# Patient Record
(demographics unavailable — no encounter records)

---

## 2024-10-28 NOTE — HISTORY OF PRESENT ILLNESS
[de-identified] : 38-year-old F with a longstanding history of obesity presents for a weight-loss medication follow-up.  2 lb weight loss since last visit two months ago; on Zepbound 15 mg/week. Reports no abdominal pain, nausea/vomiting, constipation, diarrhea, reflux/heartburn. Patient eating 3 meals/day, drinking adequate zero-calorie fluids/day, and exercising by walking and following strength training videos.  Weight at Initial Consult: 236 lbs Current Weight: 212 lbs Anti-Obesity Medication(s): Zepbound Start Date: 2/8/24 Current Dose: 15 mg Side-Effects from Anti-Obesity Medication(s): none Obesity Comorbidities: HLD, borderline HTN, hypothyroidism  Comorbidities Improved/Resolved: blood work to be done prior to next visit History of Bariatric Surgery: No

## 2024-10-28 NOTE — ASSESSMENT
[FreeTextEntry1] : 38-year-old F with a longstanding history of obesity presents for a weight-loss medication follow-up.  2 lb weight loss since last visit two months ago; on Zepbound 15 mg/week. Nutrition and exercise guidelines reviewed with the patient.   Last Labs Done: 5/28/24 (last TSH was 11.60 uIU/mL) Next Labs Due: ~11/28/24  Continue 3 well-balanced meals/day (aim for 60 g - 70 g protein/day) Try to make vegetables the largest portion of each meal, followed by a lean protein (e.g. lentils, chicken, fish), and finally a complex carbohydrate if still hungry (e.g. sweet potato, farro, quinoa)  Increase water consumption; aim for 64 oz water/day, and an additional 2 cups of water for every cup of caffeinated beverage  Encourage 150 minutes of exercise per week including both Cardio- (aim for 8k-10k steps/day) and strength training (2-3x/week) Use step counter Weigh yourself 1x-2x/week Try the Calm fiorella or Soothing Pod fiorella for stress management Follow-up with nutritionist (keep a food log) Follow-up with PCP for hypothyroidism management. Discussed that Hashimoto's/hypothyroidism is likely contributing to weight gain.  Complete labs due ~11/28/24  Continue Zepbound to 15 mg/weekly. Pt instructed to call the office immediately with any side effects.  Reiterated to pt the warnings of pregnancy while on anti-obesity medication(s): - instructed pt to avoid planned pregnancy  - advised pt to stop anti-obesity medications immediately if becomes pregnant  - call the office Pt verbalizes understanding of the above  Follow up in 2 months or sooner if needed (blood work to be done prior to next appt)  All questions answered   Call with any questions or concerns Time before and after visit spent reviewing chart

## 2024-10-28 NOTE — PHYSICAL EXAM
[Obese, well nourished, in no acute distress] : obese, well nourished, in no acute distress [Normal] : affect appropriate [de-identified] : Well, healthy-appearing adult [de-identified] : Equal chest rise, non-labored respirations, no audible wheezing. [de-identified] : Soft, NT, ND, no diastasis or hernias appreciated [de-identified] : SHARON

## 2024-11-20 NOTE — ASSESSMENT
[FreeTextEntry1] : obesity continue Zepbound and f/u with weight loss office  hair loss labs reviewed  psoriasis continue dupixent  hypothyroid increase levothyroxine and check labs in 6 weeks

## 2024-11-20 NOTE — HISTORY OF PRESENT ILLNESS
[FreeTextEntry1] : hair loss and thyroid [de-identified] : Pt c/o thinning hair increasing over the year and hypothyroid. She is on Zepbound since February and has lost 40 lbs.

## 2024-12-23 NOTE — ASSESSMENT
[FreeTextEntry1] : 38-year-old F with a longstanding history of obesity presents for a weight-loss medication follow-up. Weight loss 16 lb since last visit two months ago.  Doing well overall.   Reviewed guidelines about nutrition and snacking - protein focus diet with 3 meals/day Discussed food choices and timing of meals with front loading early in the day Avoid processed/refined foods Increase dietary fiber Colace/Miralax as needed for constipation - constipation remedies list information list given to pt Maintain adequate daily zero calorie liquid intake, goal of 64 oz/day Maintain daily exercise regimen incorporating cardio and strength training as tolerated Discussed importance of strength training Daily goal 8-10k steps/day   Reiterated to pt the warnings of pregnancy while on anti-obesity medication(s)   - instructed pt to avoid planned pregnancy   - advised pt to stop anti-obesity medications immediately if becomes pregnant   - call the office Pt verbalizes understanding of the above   Return to office in Mar 2025 Continue Zepbound 15mg x 3mos Prescription sent to [facility] mail order pharmacy Lab performed 11/8/2024, results reviewed with pt No significant abnormalities Will have office MA inform pt of the above  Fasting labs to be done May 2025 Instructed pt to call the office sooner for issues/concerns All questions answered     Additional time spent before and after visit reviewing chart

## 2024-12-23 NOTE — PHYSICAL EXAM
[Normal] : well developed, well nourished, in no acute distress [de-identified] : Well, healthy appearing adult  [de-identified] : EOMI, no conjunctival injection, anicteric  [de-identified] : No visualized masses, JVD or audible bruits noted  [de-identified] : Equal chest rise, non-labored respirations, no audible wheezing  [de-identified] : Regular rate, no audible carotid bruits or JVD appreciated  [de-identified] : Soft, NT, ND, no diastasis or hernias appreciated [de-identified] : SHARON

## 2024-12-23 NOTE — PHYSICAL EXAM
[Normal] : well developed, well nourished, in no acute distress [de-identified] : Well, healthy appearing adult  [de-identified] : EOMI, no conjunctival injection, anicteric  [de-identified] : No visualized masses, JVD or audible bruits noted  [de-identified] : Equal chest rise, non-labored respirations, no audible wheezing  [de-identified] : Regular rate, no audible carotid bruits or JVD appreciated  [de-identified] : Soft, NT, ND, no diastasis or hernias appreciated [de-identified] : SHARON

## 2024-12-23 NOTE — HISTORY OF PRESENT ILLNESS
[de-identified] : 38-year-old F with a longstanding history of obesity presents for a weight-loss medication follow-up. Weight loss 16 lb since last visit two months ago. Reports mild constipation as well as sporadic mild nausea on first day after injections. Otherwise, no abdominal pain, nor reflux/heartburn. Consuming 3 meals/day, drinking adequate zero-calorie fluids/day, and exercising by walking and following strength training videos 2x/wk.  Weight at Initial Consult: 236 lbs Current Weight: 196 lbs Anti-Obesity Medication(s): Zepbound Start Date: 2/8/24 Current Dose: 15 mg Side-Effects from Anti-Obesity Medication(s): none Obesity Comorbidities: HLD, borderline HTN, hypothyroidism  Comorbidities Improved/Resolved: blood work to be done prior to next visit History of Bariatric Surgery: No

## 2024-12-23 NOTE — HISTORY OF PRESENT ILLNESS
[de-identified] : 38-year-old F with a longstanding history of obesity presents for a weight-loss medication follow-up. Weight loss 16 lb since last visit two months ago. Reports mild constipation as well as sporadic mild nausea on first day after injections. Otherwise, no abdominal pain, nor reflux/heartburn. Consuming 3 meals/day, drinking adequate zero-calorie fluids/day, and exercising by walking and following strength training videos 2x/wk.  Weight at Initial Consult: 236 lbs Current Weight: 196 lbs Anti-Obesity Medication(s): Zepbound Start Date: 2/8/24 Current Dose: 15 mg Side-Effects from Anti-Obesity Medication(s): none Obesity Comorbidities: HLD, borderline HTN, hypothyroidism  Comorbidities Improved/Resolved: blood work to be done prior to next visit History of Bariatric Surgery: No

## 2025-03-19 NOTE — REVIEW OF SYSTEMS
[Patient Intake Form Reviewed] : Patient intake form was reviewed [Negative] : Allergic/Immunologic [FreeTextEntry7] : mild nausea and occasional constipation - see HPI

## 2025-03-19 NOTE — PHYSICAL EXAM
[Normal] : affect appropriate [de-identified] : Well, healthy appearing adult  [de-identified] : Eyeglasses, EOMI, no conjunctival injection, anicteric  [de-identified] : No visualized masses, JVD or audible bruits noted  [de-identified] : Regular rate, no audible carotid bruits or JVD appreciated  [de-identified] : Equal chest rise, non-labored respirations, no audible wheezing  [de-identified] : Soft, NT, ND, no diastasis or hernias appreciated [de-identified] : SHARON

## 2025-03-19 NOTE — PHYSICAL EXAM
[Normal] : affect appropriate [de-identified] : Well, healthy appearing adult  [de-identified] : Eyeglasses, EOMI, no conjunctival injection, anicteric  [de-identified] : No visualized masses, JVD or audible bruits noted  [de-identified] : Equal chest rise, non-labored respirations, no audible wheezing  [de-identified] : Regular rate, no audible carotid bruits or JVD appreciated  [de-identified] : Soft, NT, ND, no diastasis or hernias appreciated [de-identified] : SHARON

## 2025-03-19 NOTE — ASSESSMENT
[FreeTextEntry1] : 38-year-old F with a longstanding history of obesity presents for follow up of weight loss medication Zepbound. Weight loss 15 lb since last visit two months ago.  Doing well overall.   Reviewed guidelines about nutrition and snacking Maintain high protein and fiber diet with 3 meals/day Discussed food choices and timing of meals with front loading early in the day Avoid processed/refined foods Colace/Miralax as needed for constipation Maintain adequate daily zero calorie liquid intake, goal of 64 oz/day Maintain daily exercise regimen incorporating cardio and strength training as tolerated Discussed importance of strength training and metabolic conditioning, minimum 3x/wk Daily goal 8-10k steps/day  Return to office in June 2025 Continue Zepbound 15mg x 3mos - Will task authorization team Dennis to initiate auth renewal Prescription sent to [facility] order [facility] Lab by PCP performed 1/6/2025 - no lipid panel ordered Vitamin D level 14.8 - prescription for vitamin D supplementation 1.25mg PO QWk x 8 wks sent to [facility] pharmacy Next fasting WM labs to be done July 2025 also to include vitamin D level Instructed pt to call the office sooner for issues/concerns All questions answered Pt verbalized understanding and in agreement of the above      Additional time spent before and after visit reviewing chart

## 2025-03-19 NOTE — HISTORY OF PRESENT ILLNESS
[de-identified] : 38-year-old F with a longstanding history of obesity presents for follow up of weight loss medication Zepbound. Weight loss 15 lb since last visit 3mos ago. Reports mild nausea on first couple of days after injection. Occasional constipation, taking Miralax. Otherwise, no abdominal pain, nor reflux, vomiting or diarrhea. Consuming 3 meals/day with adequate water intake 48-64oz/day. Walking for exercises, 5-6k steps/day. Sleeping well.  Weight at Initial Consult: 236 lbs Current Weight: 181 lbs  Anti-Obesity Medication(s): Zepbound Start Date: Feb 2024 Current Dose: 15 mg Side-Effects from Anti-Obesity Medication(s): none Obesity Comorbidities: HLD, borderline HTN, hypothyroidism  Comorbidities Improved/Resolved: TSH now normal on recent labs Jan 2025, lipid panel to be done July 2025 History of Bariatric Surgery: No

## 2025-03-19 NOTE — HISTORY OF PRESENT ILLNESS
[de-identified] : 38-year-old F with a longstanding history of obesity presents for follow up of weight loss medication Zepbound. Weight loss 15 lb since last visit 3mos ago. Reports mild nausea on first couple of days after injection. Occasional constipation, taking Miralax. Otherwise, no abdominal pain, nor reflux, vomiting or diarrhea. Consuming 3 meals/day with adequate water intake 48-64oz/day. Walking for exercises, 5-6k steps/day. Sleeping well.  Weight at Initial Consult: 236 lbs Current Weight: 181 lbs  Anti-Obesity Medication(s): Zepbound Start Date: Feb 2024 Current Dose: 15 mg Side-Effects from Anti-Obesity Medication(s): none Obesity Comorbidities: HLD, borderline HTN, hypothyroidism  Comorbidities Improved/Resolved: TSH now normal on recent labs Jan 2025, lipid panel to be done July 2025 History of Bariatric Surgery: No

## 2025-06-11 NOTE — HISTORY OF PRESENT ILLNESS
[de-identified] : 38-year-old F with a longstanding history of obesity presents for follow up of weight loss medication Zepbound. Weight loss another 15 lb since last visit 3mos ago. Pt feels great noting improvement with weight loss since taking levothyroxine. Reports mild constipation, taking Miralax occasionally. Otherwise, no abdominal pain, reflux, nausea, vomiting or diarrhea. Consuming 3 meals/day with adequate water intake 48-64oz/day. Walking for exercises, 5-6k steps/day plus light strength training 2x/wk. Sleeping well.  Weight at Initial Consult: 236 lb Current Weight: 166 lb Goal weight: 150 lb  Anti-Obesity Medication(s): Zepbound Start Date: Feb 2024 Current Dose: 15 mg (has 6 pens left) Side-Effects from Anti-Obesity Medication(s): pt reports mild constipation Obesity Comorbidities: HLD, borderline HTN, hypothyroidism  Comorbidities Improved/Resolved: TSH now normal on recent labs Jan 2025, lipid panel to be done July 2025 History of Bariatric Surgery: No

## 2025-06-11 NOTE — PHYSICAL EXAM
[Normal] : affect appropriate [de-identified] : Well, healthy appearing adult  [de-identified] : Eyeglasses, EOMI, no conjunctival injection, anicteric  [de-identified] : No visualized masses, JVD or audible bruits noted  [de-identified] : Equal chest rise, non-labored respirations, no audible wheezing  [de-identified] : Regular rate, no audible carotid bruits or JVD appreciated  [de-identified] : Soft, NT, ND, no diastasis or hernias appreciated [de-identified] : SHARON

## 2025-06-11 NOTE — ASSESSMENT
[FreeTextEntry1] : 38-year-old F with a longstanding history of obesity presents for follow up of weight loss medication Zepbound. Weight loss another 15 lb since last visit 3mos ago. Pt feels great noting improvement with weight loss since taking levothyroxine Doing well overall.   Reviewed guidelines about nutrition and snacking Encouraged high protein and fiber diet with 3 meals/day Protein source list given Discussed food choices and timing of meals with front loading early in the day Avoid processed/refined foods Colace daily and Miralax as needed for constipation - constipation remedies list given to pt Maintain adequate daily zero calorie liquid intake, goal of 64 oz/day Maintain daily exercise regimen incorporating cardio and strength training as tolerated Discussed importance of strength training, minimum 3x/wk Daily goal 8-10k steps/day  Next fasting WM labs to be done July 2025 - ordered today, pt made aware  Return to office in Aug 2025 Continue Zepbound 15mg x 3 mos - current auth expires 03/19/2026 Prescription sent to [facility] Instructed pt to call the office sooner for issues/concerns All questions answered Pt verbalized understanding and in agreement of the above     Return to office in Aug 2025 Continue Zepbound 15mg x 3mos - current auth expires  03/19/2026 Prescription sent to [email or web address] mail order pharmacy Additional time spent before and after visit reviewing chart

## 2025-06-11 NOTE — PHYSICAL EXAM
[Normal] : affect appropriate [de-identified] : Well, healthy appearing adult  [de-identified] : Eyeglasses, EOMI, no conjunctival injection, anicteric  [de-identified] : No visualized masses, JVD or audible bruits noted  [de-identified] : Equal chest rise, non-labored respirations, no audible wheezing  [de-identified] : Regular rate, no audible carotid bruits or JVD appreciated  [de-identified] : Soft, NT, ND, no diastasis or hernias appreciated [de-identified] : SHARON

## 2025-06-11 NOTE — HISTORY OF PRESENT ILLNESS
[de-identified] : 38-year-old F with a longstanding history of obesity presents for follow up of weight loss medication Zepbound. Weight loss another 15 lb since last visit 3mos ago. Pt feels great noting improvement with weight loss since taking levothyroxine. Reports mild constipation, taking Miralax occasionally. Otherwise, no abdominal pain, reflux, nausea, vomiting or diarrhea. Consuming 3 meals/day with adequate water intake 48-64oz/day. Walking for exercises, 5-6k steps/day plus light strength training 2x/wk. Sleeping well.  Weight at Initial Consult: 236 lb Current Weight: 166 lb Goal weight: 150 lb  Anti-Obesity Medication(s): Zepbound Start Date: Feb 2024 Current Dose: 15 mg (has 6 pens left) Side-Effects from Anti-Obesity Medication(s): pt reports mild constipation Obesity Comorbidities: HLD, borderline HTN, hypothyroidism  Comorbidities Improved/Resolved: TSH now normal on recent labs Jan 2025, lipid panel to be done July 2025 History of Bariatric Surgery: No

## 2025-06-11 NOTE — ASSESSMENT
[FreeTextEntry1] : 38-year-old F with a longstanding history of obesity presents for follow up of weight loss medication Zepbound. Weight loss another 15 lb since last visit 3mos ago. Pt feels great noting improvement with weight loss since taking levothyroxine Doing well overall.   Reviewed guidelines about nutrition and snacking Encouraged high protein and fiber diet with 3 meals/day Protein source list given Discussed food choices and timing of meals with front loading early in the day Avoid processed/refined foods Colace daily and Miralax as needed for constipation - constipation remedies list given to pt Maintain adequate daily zero calorie liquid intake, goal of 64 oz/day Maintain daily exercise regimen incorporating cardio and strength training as tolerated Discussed importance of strength training, minimum 3x/wk Daily goal 8-10k steps/day  Next fasting WM labs to be done July 2025 - ordered today, pt made aware  Return to office in Aug 2025 Continue Zepbound 15mg x 3 mos - current auth expires 03/19/2026 Prescription sent to [facility] Instructed pt to call the office sooner for issues/concerns All questions answered Pt verbalized understanding and in agreement of the above     Return to office in Aug 2025 Continue Zepbound 15mg x 3mos - current auth expires  03/19/2026 Prescription sent to [facility] mail order pharmacy Additional time spent before and after visit reviewing chart